# Patient Record
Sex: FEMALE | Race: NATIVE HAWAIIAN OR OTHER PACIFIC ISLANDER | Employment: UNEMPLOYED | URBAN - NONMETROPOLITAN AREA
[De-identification: names, ages, dates, MRNs, and addresses within clinical notes are randomized per-mention and may not be internally consistent; named-entity substitution may affect disease eponyms.]

---

## 2023-02-01 ENCOUNTER — HOSPITAL ENCOUNTER (EMERGENCY)
Age: 28
Discharge: HOME | End: 2023-02-01

## 2023-02-01 VITALS
DIASTOLIC BLOOD PRESSURE: 75 MMHG | SYSTOLIC BLOOD PRESSURE: 122 MMHG | OXYGEN SATURATION: 98 % | TEMPERATURE: 97.5 F | RESPIRATION RATE: 14 BRPM | HEART RATE: 78 BPM

## 2023-02-01 DIAGNOSIS — L03.012 PARONYCHIA OF FINGER OF LEFT HAND: Primary | ICD-10-CM

## 2023-02-01 PROCEDURE — 99213 OFFICE O/P EST LOW 20 MIN: CPT

## 2023-02-01 PROCEDURE — 99213 OFFICE O/P EST LOW 20 MIN: CPT | Performed by: EMERGENCY MEDICINE

## 2023-02-01 RX ORDER — SULFAMETHOXAZOLE AND TRIMETHOPRIM 800; 160 MG/1; MG/1
1 TABLET ORAL 2 TIMES DAILY
Qty: 14 TABLET | Refills: 0 | Status: SHIPPED | OUTPATIENT
Start: 2023-02-01 | End: 2023-02-08

## 2023-02-01 ASSESSMENT — PAIN DESCRIPTION - LOCATION: LOCATION: FINGER (COMMENT WHICH ONE)

## 2023-02-01 ASSESSMENT — PAIN DESCRIPTION - DESCRIPTORS: DESCRIPTORS: SORE;TENDER

## 2023-02-01 ASSESSMENT — PAIN DESCRIPTION - PAIN TYPE: TYPE: ACUTE PAIN

## 2023-02-01 ASSESSMENT — PAIN DESCRIPTION - FREQUENCY: FREQUENCY: CONTINUOUS

## 2023-02-01 ASSESSMENT — PAIN - FUNCTIONAL ASSESSMENT: PAIN_FUNCTIONAL_ASSESSMENT: 0-10

## 2023-02-01 ASSESSMENT — PAIN SCALES - GENERAL: PAINLEVEL_OUTOF10: 3

## 2023-02-01 ASSESSMENT — PAIN DESCRIPTION - ORIENTATION: ORIENTATION: LEFT

## 2023-02-01 NOTE — Clinical Note
Alisa Ramos was seen and treated in our emergency department on 2/1/2023. She may return to work on 02/02/2023. If you have any questions or concerns, please don't hesitate to call.       Ankur Mitchell, APRN - CNP

## 2023-02-02 NOTE — ED PROVIDER NOTES
[address]  Urgent Care Encounter       CHIEF COMPLAINT       Chief Complaint   Patient presents with    Finger Pain     Onset a week ago, left middle finger        Nurses Notes reviewed and I agree except as noted in the HPI. HISTORY OF PRESENT ILLNESS   Alisa Ramos is a 24 y.o. female who presents for complaints of left finger pain. Patient states there has been swelling and tenderness to the area for the past week. There is now a greenish colored area of swelling near the fingernail. HPI    REVIEW OF SYSTEMS     Review of Systems   Constitutional:  Negative for activity change, fatigue and fever. Musculoskeletal:  Positive for arthralgias (left middle finger). Skin:  Positive for wound (left middle finger). PAST MEDICAL HISTORY   History reviewed. No pertinent past medical history. SURGICALHISTORY     Patient  has no past surgical history on file. CURRENT MEDICATIONS       Discharge Medication List as of 2/1/2023  8:04 PM          ALLERGIES     Patient is is allergic to penicillins. Patients   There is no immunization history on file for this patient. FAMILY HISTORY     Patient's family history is not on file. SOCIAL HISTORY     Patient  reports that she has never smoked. She has never been exposed to tobacco smoke. She has never used smokeless tobacco. She reports that she does not drink alcohol. PHYSICAL EXAM     ED TRIAGE VITALS  BP: 122/75, Temp: 97.5 °F (36.4 °C), Heart Rate: 78, Resp: 14, SpO2: 98 %,There is no height or weight on file to calculate BMI.,No LMP recorded. Physical Exam  Constitutional:       Appearance: She is normal weight. Cardiovascular:      Rate and Rhythm: Normal rate. Pulses: Normal pulses. Pulmonary:      Effort: Pulmonary effort is normal.   Musculoskeletal:      Left hand: Swelling present. Hands:       Comments: Paronychia to the fingernail of the left middle finger   Skin:     General: Skin is warm and dry. Capillary Refill: Capillary refill takes less than 2 seconds. Neurological:      General: No focal deficit present. Mental Status: She is alert. DIAGNOSTIC RESULTS     Labs:No results found for this visit on 02/01/23. IMAGING:    No orders to display         EKG:      URGENT CARE COURSE:     Vitals:    02/01/23 1928   BP: 122/75   Pulse: 78   Resp: 14   Temp: 97.5 °F (36.4 °C)   TempSrc: Temporal   SpO2: 98%       Medications - No data to display         PROCEDURES:  None    FINAL IMPRESSION      1. Paronychia of finger of left hand          DISPOSITION/ PLAN   Patient presents for paronychia of the left middle finger. The area was cleaned with alcohol prep pad and a sterile 18-gauge needle was used to make a small superficial incision to express a large amount of purulent drainage. The patient will be discharged with prescription for Bactrim. Recommended Epsom salt soaks. Tylenol/ibuprofen for pain. Follow-up for new or worsening symptoms. PATIENT REFERRED TO:  No primary care provider on file. No primary physician on file.       DISCHARGE MEDICATIONS:  Discharge Medication List as of 2/1/2023  8:04 PM        START taking these medications    Details   sulfamethoxazole-trimethoprim (BACTRIM DS) 800-160 MG per tablet Take 1 tablet by mouth 2 times daily for 7 days, Disp-14 tablet, R-0Normal             Discharge Medication List as of 2/1/2023  8:04 PM          Discharge Medication List as of 2/1/2023  8:04 PM          ANTON Estrada CNP    (Please note that portions of this note were completed with a voice recognition program. Efforts were made to edit the dictations but occasionally words are mis-transcribed.)           ANTON Estrada CNP  02/01/23 2008

## 2023-02-02 NOTE — DISCHARGE INSTRUCTIONS
Warm Epsom salts soaks 2-3 times a day will be helpful    Bactrim as directed until gone    Tylenol/ibuprofen as needed for pain    Return for increased swelling, fever or any new concerns

## 2023-02-02 NOTE — ED NOTES
Patient discharge instructions given to pt and pt verbalized understanding, 1 px given, no other needs at this time, and pt left in stable condition.      Inez Mascorro RN  02/01/23 2006